# Patient Record
Sex: FEMALE | Race: WHITE | Employment: FULL TIME | URBAN - METROPOLITAN AREA
[De-identification: names, ages, dates, MRNs, and addresses within clinical notes are randomized per-mention and may not be internally consistent; named-entity substitution may affect disease eponyms.]

---

## 2017-11-08 ENCOUNTER — HOSPITAL ENCOUNTER (INPATIENT)
Facility: HOSPITAL | Age: 42
LOS: 3 days | Discharge: HOME | DRG: 153 | End: 2017-11-11
Attending: EMERGENCY MEDICINE | Admitting: HOSPITALIST
Payer: COMMERCIAL

## 2017-11-08 ENCOUNTER — APPOINTMENT (OUTPATIENT)
Dept: CT IMAGING | Facility: HOSPITAL | Age: 42
DRG: 153 | End: 2017-11-08
Attending: EMERGENCY MEDICINE
Payer: COMMERCIAL

## 2017-11-08 DIAGNOSIS — J36 ABSCESS OF TONSIL: Primary | ICD-10-CM

## 2017-11-08 PROCEDURE — 70491 CT SOFT TISSUE NECK W/DYE: CPT | Performed by: EMERGENCY MEDICINE

## 2017-11-08 PROCEDURE — 99223 1ST HOSP IP/OBS HIGH 75: CPT | Performed by: HOSPITALIST

## 2017-11-08 RX ORDER — POLYETHYLENE GLYCOL 3350 17 G/17G
17 POWDER, FOR SOLUTION ORAL DAILY PRN
Status: DISCONTINUED | OUTPATIENT
Start: 2017-11-08 | End: 2017-11-11

## 2017-11-08 RX ORDER — BISACODYL 10 MG
10 SUPPOSITORY, RECTAL RECTAL
Status: DISCONTINUED | OUTPATIENT
Start: 2017-11-08 | End: 2017-11-11

## 2017-11-08 RX ORDER — ONDANSETRON 2 MG/ML
4 INJECTION INTRAMUSCULAR; INTRAVENOUS EVERY 4 HOURS PRN
Status: DISCONTINUED | OUTPATIENT
Start: 2017-11-08 | End: 2017-11-08

## 2017-11-08 RX ORDER — DEXTROSE AND SODIUM CHLORIDE 5; .45 G/100ML; G/100ML
INJECTION, SOLUTION INTRAVENOUS CONTINUOUS
Status: ACTIVE | OUTPATIENT
Start: 2017-11-08 | End: 2017-11-08

## 2017-11-08 RX ORDER — DEXAMETHASONE SODIUM PHOSPHATE 4 MG/ML
8 VIAL (ML) INJECTION EVERY 6 HOURS
Status: COMPLETED | OUTPATIENT
Start: 2017-11-08 | End: 2017-11-10

## 2017-11-08 RX ORDER — TRAZODONE HYDROCHLORIDE 50 MG/1
50 TABLET ORAL NIGHTLY PRN
Status: DISCONTINUED | OUTPATIENT
Start: 2017-11-08 | End: 2017-11-11

## 2017-11-08 RX ORDER — AMOXICILLIN AND CLAVULANATE POTASSIUM 875; 125 MG/1; MG/1
1 TABLET, FILM COATED ORAL 2 TIMES DAILY
COMMUNITY
Start: 2017-11-07 | End: 2017-11-16

## 2017-11-08 RX ORDER — MORPHINE SULFATE 4 MG/ML
1 INJECTION, SOLUTION INTRAMUSCULAR; INTRAVENOUS EVERY 2 HOUR PRN
Status: DISCONTINUED | OUTPATIENT
Start: 2017-11-08 | End: 2017-11-11

## 2017-11-08 RX ORDER — NADOLOL 20 MG/1
20 TABLET ORAL DAILY
COMMUNITY

## 2017-11-08 RX ORDER — HYDROMORPHONE HYDROCHLORIDE 1 MG/ML
0.5 INJECTION, SOLUTION INTRAMUSCULAR; INTRAVENOUS; SUBCUTANEOUS EVERY 30 MIN PRN
Status: DISCONTINUED | OUTPATIENT
Start: 2017-11-08 | End: 2017-11-08

## 2017-11-08 RX ORDER — MORPHINE SULFATE 4 MG/ML
4 INJECTION, SOLUTION INTRAMUSCULAR; INTRAVENOUS ONCE
Status: COMPLETED | OUTPATIENT
Start: 2017-11-08 | End: 2017-11-08

## 2017-11-08 RX ORDER — ONDANSETRON 2 MG/ML
4 INJECTION INTRAMUSCULAR; INTRAVENOUS EVERY 6 HOURS PRN
Status: DISCONTINUED | OUTPATIENT
Start: 2017-11-08 | End: 2017-11-11

## 2017-11-08 RX ORDER — IBUPROFEN 200 MG
600 TABLET ORAL EVERY 4 HOURS PRN
COMMUNITY
End: 2021-02-19

## 2017-11-08 RX ORDER — ACETAMINOPHEN 325 MG/1
650 TABLET ORAL EVERY 4 HOURS PRN
Status: DISCONTINUED | OUTPATIENT
Start: 2017-11-08 | End: 2017-11-11

## 2017-11-08 RX ORDER — MORPHINE SULFATE 4 MG/ML
4 INJECTION, SOLUTION INTRAMUSCULAR; INTRAVENOUS EVERY 2 HOUR PRN
Status: DISCONTINUED | OUTPATIENT
Start: 2017-11-08 | End: 2017-11-11

## 2017-11-08 RX ORDER — IBUPROFEN 600 MG/1
600 TABLET ORAL ONCE
Status: COMPLETED | OUTPATIENT
Start: 2017-11-08 | End: 2017-11-08

## 2017-11-08 RX ORDER — MORPHINE SULFATE 4 MG/ML
INJECTION, SOLUTION INTRAMUSCULAR; INTRAVENOUS
Status: DISPENSED
Start: 2017-11-08 | End: 2017-11-09

## 2017-11-08 RX ORDER — MORPHINE SULFATE 4 MG/ML
2 INJECTION, SOLUTION INTRAMUSCULAR; INTRAVENOUS EVERY 2 HOUR PRN
Status: DISCONTINUED | OUTPATIENT
Start: 2017-11-08 | End: 2017-11-11

## 2017-11-08 RX ORDER — SODIUM CHLORIDE 9 MG/ML
INJECTION, SOLUTION INTRAVENOUS CONTINUOUS
Status: DISCONTINUED | OUTPATIENT
Start: 2017-11-08 | End: 2017-11-11

## 2017-11-08 RX ORDER — HYDROCODONE BITARTRATE AND ACETAMINOPHEN 5; 325 MG/1; MG/1
1 TABLET ORAL EVERY 4 HOURS PRN
Status: DISCONTINUED | OUTPATIENT
Start: 2017-11-08 | End: 2017-11-11

## 2017-11-08 RX ORDER — DOCUSATE SODIUM 100 MG/1
100 CAPSULE, LIQUID FILLED ORAL 2 TIMES DAILY
Status: DISCONTINUED | OUTPATIENT
Start: 2017-11-08 | End: 2017-11-11

## 2017-11-08 RX ORDER — SODIUM PHOSPHATE, DIBASIC AND SODIUM PHOSPHATE, MONOBASIC 7; 19 G/133ML; G/133ML
1 ENEMA RECTAL ONCE AS NEEDED
Status: DISCONTINUED | OUTPATIENT
Start: 2017-11-08 | End: 2017-11-11

## 2017-11-08 RX ORDER — HYDROCODONE BITARTRATE AND ACETAMINOPHEN 5; 325 MG/1; MG/1
2 TABLET ORAL EVERY 4 HOURS PRN
Status: DISCONTINUED | OUTPATIENT
Start: 2017-11-08 | End: 2017-11-11

## 2017-11-08 NOTE — ED PROVIDER NOTES
Patient Seen in: [facility] Emergency Department    History   Patient presents with:  Swelling Edema (cardiovascular, metabolic)    Stated Complaint: jaw swelling/ pain s/p wisdom teeth removal    HPI    45-year-old white female who presents today f or distress. Vital signs are stable she is afebrile    Head is normocephalic atraumatic conjunctiva is clear. Sclerae anicteric. Neck is supple. Patient has significant trismus can openly open up her jaw about a centimeter.   There is swelling on the DRAW GOLD   CT scan of the neck reveals:  FINDINGS:  NASOPHARYNX:  Fossae of Rosenmuller and torus tubarius are symmetric.    ORAL CAVITY:  No visible mass.    OROPHARYNX:  Enlarged right palatine tonsil with central low attenuation which measures 2.0 x 1. disposition: 11/8/2017 12:56 PM           Disposition and Plan     Clinical Impression:  Abscess of tonsil  (primary encounter diagnosis)    Disposition:  Admit  11/8/2017 12:56 pm    Follow-up:  No follow-up provider specified.       Medications Prescribed

## 2017-11-08 NOTE — PROGRESS NOTES
NURSING ADMISSION NOTE      Patient admitted via Cart  Oriented to room. Safety precautions initiated. Bed in low position. Call light in reach. Pt arrived at this time from ER. Alert and oriented x4.  States pain is about a 7/10, awaiting admissi

## 2017-11-08 NOTE — H&P
[facility]  History and Physical     Mando Cornelia Patient Status:  Inpatient    2/10/1975 MRN [id number]   [facility] 3NW-A Attending Kit MD Feliberto   Hosp Day # 0 PCP Molly Brenner     Chief Complaint: jaw swelling membranes. EOM-I. PERRLA. Anicteric. R sided facial and neck swelling w/o erythema, painful. +trismus. No parotid swelling   Neck: No lymphadenopathy. No JVD. No carotid bruits. Prior tracheostomy scar   Respiratory: Clear to auscultation bilaterally.  No w

## 2017-11-08 NOTE — CONSULTS
OMFS    Asked to evaluate patient. Pt known to me, extraction of wisdom teeth 2 weeks ago. Was feeling good, until 3 days ago noticed stiffness of jaw, difficulty opening, increased pain. Was seen in office yesterday, started on Augmentin.   Called pt th

## 2017-11-08 NOTE — PROGRESS NOTES
Dr. Vicente Charlton, on call for Dr. Claude Mclain, called at this time for admission orders. Awaiting return phone call.

## 2017-11-08 NOTE — PROGRESS NOTES
[facility] Pharmacy Note:  Renal Adjustment for Unasyn (ampicillin/sulbactam)    Mando Locke is a 43year old female who has been prescribed Unasyn (ampicillin/sulbactam) 3 gm every 6 hrs.   CrCl is estimated creatinine clearance is 94 mL/min (based on SCr o

## 2017-11-09 PROCEDURE — 99232 SBSQ HOSP IP/OBS MODERATE 35: CPT | Performed by: HOSPITALIST

## 2017-11-09 RX ORDER — POTASSIUM CHLORIDE 20 MEQ/1
40 TABLET, EXTENDED RELEASE ORAL ONCE
Status: COMPLETED | OUTPATIENT
Start: 2017-11-09 | End: 2017-11-09

## 2017-11-09 NOTE — PAYOR COMM NOTE
--------------  Geneva Fabian  (MR # [id number])   Order Admit to inpatient Once Jose C Katz (Order [id number])   Order Requisition   Admit to inpatient Once (Order #[id number]) on 11/8/17        Question Answer   Diagnosis Abscess of tonsil   Level of Care Ac Patient has significant trismus can openly open up her jaw about a centimeter.   There is swelling on the right side of her face extending from the angle of mandible down about 5 cm along her neck it is tender to palpation but not erythematous or warm to th History of Present Illness: Randolph Branch is a 43year old female with a history of long Qt who presents with R sided facial swelling. Patient had her wisdom teeth extracted two weeks ago. Since then she has had some swelling/pain.  Two days ago symptom Ampicillin-Sulbactam Sodium (UNASYN) 3 g in sodium chloride 0.9 % 100 mL IVPB-minibag     Date Action Dose Route User    11/9/2017 1112 New Bag 3 g Intravenous My Haines RN      dexamethasone Sodium Phosphate (DECADRON) 4 MG/ML injection 8 mg Asked to evaluate patient. Pt known to me, extraction of wisdom teeth 2 weeks ago. Was feeling good, until 3 days ago noticed stiffness of jaw, difficulty opening, increased pain. Was seen in office yesterday, started on Augmentin.   Called pt this am fo

## 2017-11-09 NOTE — PLAN OF CARE
Impaired Communication    • Patient will achieve maximal communication potential Progressing        Impaired Swallowing    • Minimize aspiration risk Progressing        PAIN - ADULT    • Verbalizes/displays adequate comfort level or patient's stated pain g

## 2017-11-09 NOTE — PLAN OF CARE
DISCHARGE PLANNING    • Discharge to home or other facility with appropriate resources Progressing        Impaired Communication    • Patient will achieve maximal communication potential Progressing        Impaired Swallowing    • Minimize aspiration risk

## 2017-11-09 NOTE — CONSULTS
[facility]  Report of Consultation    Jeffersonpat Rivera Patient Status:  Inpatient    2/10/1975 MRN [id number]   [facility] 3NW-A Attending Casandra Villarreal MD   Hosp Day # 0 PCP Cheri Miller     Reason for Consultation:  Throat pa magnesium hydroxide (MILK OF MAGNESIA) 400 MG/5ML suspension 30 mL, 30 mL, Oral, Daily PRN  •  bisacodyl (DULCOLAX) rectal suppository 10 mg, 10 mg, Rectal, Daily PRN  •  FLEET ENEMA (FLEET) 7-19 GM/118ML enema 133 mL, 1 enema, Rectal, Once PRN  •  ondans

## 2017-11-09 NOTE — PROGRESS NOTES
[facility]  Progress Note     Toro Jose Antonio Patient Status:  Inpatient    2/10/1975 MRN [id number]   [facility] 3NW-A Attending Reagan Snow MD   Hosp Day # 1 PCP Mai Okeefe     Chief Complaint: peritonsillar abscess metoprolol tartrate  25 mg Oral 2x Daily(Beta Blocker)   • docusate sodium  100 mg Oral BID       ASSESSMENT / PLAN:     1. Peritonsillar abscess after tooth extraction   1. Cont unasyn, decadron   2. OMFS/ENT consult    2. Long qt syndrome   1.  On nadolol

## 2017-11-09 NOTE — PROGRESS NOTES
OMFS    Pt states feels much better this morning, pain much improved, feels swelling improving. VSS/AF    WBC 11.4(10.57)    Submandibular swelling improved, softer, no redness, still significant trismus. FOM soft.  No tongue deviation    A/P  Pt is impr

## 2017-11-10 PROCEDURE — 99232 SBSQ HOSP IP/OBS MODERATE 35: CPT | Performed by: HOSPITALIST

## 2017-11-10 NOTE — PROGRESS NOTES
[facility]  Progress Note     Mando Locke Patient Status:  Inpatient    2/10/1975 MRN [id number]   [facility] 3NW-A Attending Ann-Marie Diego MD   Hosp Day # 2 PCP Molly Brenner     Chief Complaint: peritonsillar abscess tartrate  25 mg Oral 2x Daily(Beta Blocker)   • docusate sodium  100 mg Oral BID       ASSESSMENT / PLAN:     1. Peritonsillar abscess after tooth extraction   1. Cont unasyn, decadron   2. OMFS/ENT consult    3. Strep screen  2. Long qt syndrome   1.  On n

## 2017-11-10 NOTE — PROGRESS NOTES
OMFS    No complaints, continuing to feel better  VSS AF    Submandibular swelling improved  Opening 30mm less pain  No FOM swelling  No uvula, tongue deviation    A/P Lateral phayengeal absces improving    Ok to advance diet    Continue Unasyn    Ok to D/

## 2017-11-11 VITALS
SYSTOLIC BLOOD PRESSURE: 136 MMHG | HEART RATE: 46 BPM | RESPIRATION RATE: 16 BRPM | WEIGHT: 174 LBS | DIASTOLIC BLOOD PRESSURE: 69 MMHG | TEMPERATURE: 98 F | OXYGEN SATURATION: 98 % | BODY MASS INDEX: 23.57 KG/M2 | HEIGHT: 72 IN

## 2017-11-11 PROCEDURE — 99239 HOSP IP/OBS DSCHRG MGMT >30: CPT | Performed by: HOSPITALIST

## 2017-11-11 NOTE — DISCHARGE SUMMARY
[facility]  DISCHARGE SUMMARY     Reynaldo Moulton Patient Status:  Inpatient    2/10/1975 MRN [id number]   [facility] 3NW-A Attending No att. providers found   Hosp Day # 3 PCP TABITHA ESTEVES CATCHING     Date of Admission: 2017  Da descriptions):  • Complete course of PO augmentin    Lab/Test results pending at Discharge:   · Strep Culture     Consultants:  • ENT, OMFS     Discharge Medication List:     Discharge Medications      CONTINUE taking these medications      Instructions Pr

## 2017-11-11 NOTE — PROGRESS NOTES
Pts HR dropped to 40s. Pt sleeping at the time, asymptomatic. BP stable. Lopressor held. Dr. Cici Mcclure notified. No new orders at this time. Will cont to monitor.

## 2017-11-11 NOTE — PROGRESS NOTES
Pt feels much better  Bradycardic ovenight-beta blocker held    VSS (HR 47 overnight)  AF    Swelling resolved   REGGIE 35mm  FOM soft  No uvula deviation    A/P OK to d/c from OMFS standpoint.     1.  Augmentin 875 BID x 10 days (patient has script at home)

## 2017-11-11 NOTE — PLAN OF CARE
Patient resting in bed. VSS. Denies any difficulty swallowing, chewing or talking. Complaints of headache, medication given as ordered. POC discussed, all questions and concerns addressed. All safety measures in place. Will continue to monitor.

## 2017-11-11 NOTE — PLAN OF CARE
Impaired Communication    • Patient will achieve maximal communication potential Adequate for Discharge        Impaired Swallowing    • Minimize aspiration risk Adequate for Discharge        RESPIRATORY - ADULT    • Achieves optimal ventilation and oxygena

## 2017-11-16 NOTE — PAYOR COMM NOTE
--------------  CONTINUED STAY REVIEW 11/10    OMFS     No complaints, continuing to feel better  VSS AF     Submandibular swelling improved  Opening 30mm less pain  No FOM swelling  No uvula, tongue deviation     A/P Lateral phayengeal absces improving

## 2019-05-29 PROCEDURE — 87086 URINE CULTURE/COLONY COUNT: CPT | Performed by: PHYSICIAN ASSISTANT

## 2019-05-29 PROCEDURE — 81015 MICROSCOPIC EXAM OF URINE: CPT | Performed by: PHYSICIAN ASSISTANT

## 2019-06-26 PROCEDURE — 87086 URINE CULTURE/COLONY COUNT: CPT | Performed by: PHYSICIAN ASSISTANT

## 2021-02-19 ENCOUNTER — OFFICE VISIT (OUTPATIENT)
Dept: FAMILY MEDICINE CLINIC | Facility: CLINIC | Age: 46
End: 2021-02-19
Payer: COMMERCIAL

## 2021-02-19 VITALS
TEMPERATURE: 97 F | HEART RATE: 76 BPM | OXYGEN SATURATION: 99 % | BODY MASS INDEX: 29.41 KG/M2 | HEIGHT: 66 IN | RESPIRATION RATE: 16 BRPM | WEIGHT: 183 LBS | DIASTOLIC BLOOD PRESSURE: 72 MMHG | SYSTOLIC BLOOD PRESSURE: 126 MMHG

## 2021-02-19 DIAGNOSIS — H66.001 ACUTE SUPPURATIVE OTITIS MEDIA OF RIGHT EAR WITHOUT SPONTANEOUS RUPTURE OF TYMPANIC MEMBRANE, RECURRENCE NOT SPECIFIED: ICD-10-CM

## 2021-02-19 DIAGNOSIS — H60.501 ACUTE OTITIS EXTERNA OF RIGHT EAR, UNSPECIFIED TYPE: Primary | ICD-10-CM

## 2021-02-19 PROCEDURE — 3008F BODY MASS INDEX DOCD: CPT | Performed by: NURSE PRACTITIONER

## 2021-02-19 PROCEDURE — 99203 OFFICE O/P NEW LOW 30 MIN: CPT | Performed by: NURSE PRACTITIONER

## 2021-02-19 PROCEDURE — 3078F DIAST BP <80 MM HG: CPT | Performed by: NURSE PRACTITIONER

## 2021-02-19 PROCEDURE — 3074F SYST BP LT 130 MM HG: CPT | Performed by: NURSE PRACTITIONER

## 2021-02-19 RX ORDER — OFLOXACIN 3 MG/ML
10 SOLUTION AURICULAR (OTIC) DAILY
Qty: 1 BOTTLE | Refills: 0 | Status: SHIPPED | OUTPATIENT
Start: 2021-02-19 | End: 2021-02-26

## 2021-02-19 RX ORDER — AMOXICILLIN 500 MG/1
500 CAPSULE ORAL 3 TIMES DAILY
Qty: 30 CAPSULE | Refills: 0 | Status: SHIPPED | OUTPATIENT
Start: 2021-02-19 | End: 2021-03-01

## 2021-02-19 NOTE — PATIENT INSTRUCTIONS
1. Rest. Drink plenty of fluids. 2. Ofloxacin ear drops as presribed. 3. Tylenol/Ibuprofen as directed for pain. 4. No swimming until completely resolved. 5. We discussed \"watch and wait\" option for possible early middle ear infection.  If symptom cleared. Prevention  · Keep your ears dry. This helps lower the risk of infection. Dry your ears with a towel or hair dryer after getting wet. Also, use ear plugs when swimming. · Don't stick any objects in the ear to remove wax.   · Talk with your prov infections in adults:   · Ear pain  · Feeling of fullness in the ear  · Fluid draining from the ear  · Fever  · Hearing loss  These symptoms may look like other conditions or health problems. Always talk with your healthcare provider for a diagnosis.    How antibiotics as prescribed and finish all of the prescription. This can help prevent antibiotic-resistant infections or incomplete treatment with the infection returning.     Next steps  Tips to help you get the most from a visit to your healthcare provider:

## 2021-02-19 NOTE — PROGRESS NOTES
CHIEF COMPLAINT:   Patient presents with:  Ear Problem: Ear ache since Tuesday - Entered by patient      HPI:   Birdie Nowak is a non-toxic, well appearing 55year old female who presents today with complaints of right ear pain.   Notes it has been p /72   Pulse 76   Temp 97.4 °F (36.3 °C)   Resp 16   Ht 5' 6\" (1.676 m)   Wt 183 lb (83 kg)   LMP 02/08/2021   SpO2 99%   BMI 29.54 kg/m²     GENERAL: well developed, well nourished,in no apparent distress  SKIN: no rashes,no suspicious lesions  HEAD Discussed physical exam and hpi with pt. Pt has reassuring physical exam consistent with right otitis externa. We discussed TM is slightly erythematous so concern for possible early OM. No sinus symptoms or pharyngitis. Lungs clear bilat.   No respiratory d Symptoms can include pain, fever, itching, redness, drainage, or swelling of the ear canal. Temporary hearing loss may also occur.    Home care  · Don't try to clean the ear canal. This can push pus and bacteria deeper into the canal.  · Use prescribed ear · Unusual drowsiness or confusion  · Unusual painful or stiff neck    Alex last reviewed this educational content on 8/1/2020  © 9278-5547 [employer]. All rights reserved.  This information is not intended as a substitute for professional Treatment will depend on your symptoms, age, and general health. It will also depend on how severe the condition is.    Treatment may include:  · Antibiotics  · Pain relievers  · Placing small tubes in the eardrum for chronic ear infections   What are possi · Know what to expect if you do not take the medicine or have the test or procedure. · If you have a follow-up appointment, write down the date, time, and purpose for that visit. · Know how you can contact your provider if you have questions.   [employer] l

## 2022-12-20 ENCOUNTER — OFFICE VISIT (OUTPATIENT)
Dept: FAMILY MEDICINE CLINIC | Facility: CLINIC | Age: 47
End: 2022-12-20
Payer: COMMERCIAL

## 2022-12-20 VITALS
OXYGEN SATURATION: 99 % | HEART RATE: 81 BPM | RESPIRATION RATE: 18 BRPM | BODY MASS INDEX: 25.23 KG/M2 | WEIGHT: 157 LBS | DIASTOLIC BLOOD PRESSURE: 72 MMHG | SYSTOLIC BLOOD PRESSURE: 112 MMHG | HEIGHT: 66 IN | TEMPERATURE: 98 F

## 2022-12-20 DIAGNOSIS — R68.89 FLU-LIKE SYMPTOMS: Primary | ICD-10-CM

## 2022-12-20 PROCEDURE — 3008F BODY MASS INDEX DOCD: CPT | Performed by: NURSE PRACTITIONER

## 2022-12-20 PROCEDURE — 3074F SYST BP LT 130 MM HG: CPT | Performed by: NURSE PRACTITIONER

## 2022-12-20 PROCEDURE — 3078F DIAST BP <80 MM HG: CPT | Performed by: NURSE PRACTITIONER

## 2022-12-20 PROCEDURE — 87637 SARSCOV2&INF A&B&RSV AMP PRB: CPT | Performed by: NURSE PRACTITIONER

## 2022-12-20 PROCEDURE — 99213 OFFICE O/P EST LOW 20 MIN: CPT | Performed by: NURSE PRACTITIONER

## 2022-12-20 NOTE — PATIENT INSTRUCTIONS
Push fluids and rest  You can take an over the counter cold/ flu medication if needed  Follow up for any new or worsening symptoms or if symptoms are not improving over the next 5-7 days.

## 2022-12-21 LAB
FLUAV + FLUBV RNA SPEC NAA+PROBE: DETECTED
FLUAV + FLUBV RNA SPEC NAA+PROBE: NOT DETECTED
RSV RNA SPEC NAA+PROBE: NOT DETECTED
SARS-COV-2 RNA RESP QL NAA+PROBE: DETECTED

## (undated) NOTE — LETTER
[facility]  Julius Maria Luisaricky 61 [address] [facility], [address] [place] [address]    Consent for Operation    Date: __________________    Time: _______________    1.  I authorize the performance upon Richy Setting the following operation:    Procedure(s):  INCISION AND DRAIN procedure has been videotaped, the surgeon will obtain the original videotape. The hospital will not be responsible for storage or maintenance of this tape.     6. For the purpose of advancing medical education, I consent to the admittance of observers to t STATEMENTS REQUIRING INSERTION OR COMPLETION WERE FILLED IN.     Signature of Patient:   ___________________________    When the patient is a minor or mentally incompetent to give consent:  Signature of person authorized to consent for patient: ____________ supplements, and pills I can buy without a prescription (including street drugs/illegal medications). Failure to inform my anesthesiologist about these medicines may increase my risk of anesthetic complications.   · If I am allergic to anything or have had Anesthesiologist Signature     Date   Time  I have discussed the procedure and information above with the patient (or patient’s representative) and answered their questions. The patient or their representative has agreed to have anesthesia services.     ___

## (undated) NOTE — LETTER
[facility]  Julius Ortiz 61 [id number] [facility], [address] [place] [address]    Consent for Operation    Date: __________________    Time: _______________    1.  I authorize the performance upon Lenice Dies the following operation:    Procedure(s):  INCISION AND DRAIN procedure has been videotaped, the surgeon will obtain the original videotape. The hospital will not be responsible for storage or maintenance of this tape.     6. For the purpose of advancing medical education, I consent to the admittance of observers to t STATEMENTS REQUIRING INSERTION OR COMPLETION WERE FILLED IN.     Signature of Patient:   ___________________________    When the patient is a minor or mentally incompetent to give consent:  Signature of person authorized to consent for patient: ____________ supplements, and pills I can buy without a prescription (including street drugs/illegal medications). Failure to inform my anesthesiologist about these medicines may increase my risk of anesthetic complications.   · If I am allergic to anything or have had Anesthesiologist Signature     Date   Time  I have discussed the procedure and information above with the patient (or patient’s representative) and answered their questions. The patient or their representative has agreed to have anesthesia services.     ___